# Patient Record
Sex: FEMALE | Race: WHITE | NOT HISPANIC OR LATINO | URBAN - METROPOLITAN AREA
[De-identification: names, ages, dates, MRNs, and addresses within clinical notes are randomized per-mention and may not be internally consistent; named-entity substitution may affect disease eponyms.]

---

## 2018-06-19 LAB
GBS_EXT (HISTORICAL CONVERSION): POSITIVE
HBSAG_EXT (HISTORICAL CONVERSION): NORMAL
HIV_EXT: NORMAL
RPR - HISTORICAL: NORMAL
RUBELLA_EXT (HISTORICAL CONVERSION): NORMAL

## 2019-01-23 ENCOUNTER — SURGERY - HEALTHEAST (OUTPATIENT)
Dept: OBGYN | Facility: HOSPITAL | Age: 34
End: 2019-01-23

## 2019-01-23 ENCOUNTER — ANESTHESIA - HEALTHEAST (OUTPATIENT)
Dept: OBGYN | Facility: HOSPITAL | Age: 34
End: 2019-01-23

## 2019-01-23 ENCOUNTER — HOSPITAL ENCOUNTER (INPATIENT)
Dept: OBGYN | Facility: HOSPITAL | Age: 34
Discharge: HOME | End: 2019-01-26
Attending: OBSTETRICS & GYNECOLOGY | Admitting: OBSTETRICS & GYNECOLOGY

## 2019-01-23 DIAGNOSIS — Z98.891 S/P CESAREAN SECTION: ICD-10-CM

## 2019-01-23 DIAGNOSIS — Z3A.40 40 WEEKS GESTATION OF PREGNANCY: ICD-10-CM

## 2019-01-23 LAB
ABO/RH(D): NORMAL
ANTIBODY SCREEN: NEGATIVE
HGB BLD-MCNC: 12.7 G/DL (ref 12–16)

## 2019-01-23 ASSESSMENT — MIFFLIN-ST. JEOR
SCORE: 1541.37
SCORE: 1541.37

## 2019-01-24 LAB
HGB BLD-MCNC: 9.7 G/DL (ref 12–16)
T PALLIDUM AB SER QL: NEGATIVE

## 2019-01-25 ENCOUNTER — HOME CARE/HOSPICE - HEALTHEAST (OUTPATIENT)
Dept: HOME HEALTH SERVICES | Facility: HOME HEALTH | Age: 34
End: 2019-01-25

## 2019-01-25 RX ORDER — IBUPROFEN 600 MG/1
600 TABLET, FILM COATED ORAL EVERY 6 HOURS PRN
Qty: 30 TABLET | Refills: 0 | Status: SHIPPED | OUTPATIENT
Start: 2019-01-25

## 2019-01-26 RX ORDER — OXYCODONE HYDROCHLORIDE 5 MG/1
5 TABLET ORAL EVERY 6 HOURS PRN
Qty: 20 TABLET | Refills: 0 | Status: SHIPPED | OUTPATIENT
Start: 2019-01-26

## 2021-06-02 VITALS
HEIGHT: 68 IN | WEIGHT: 177 LBS | BODY MASS INDEX: 26.83 KG/M2 | BODY MASS INDEX: 26.83 KG/M2 | WEIGHT: 177 LBS | HEIGHT: 68 IN

## 2021-06-16 PROBLEM — Z98.891 S/P CESAREAN SECTION: Status: ACTIVE | Noted: 2019-01-25

## 2021-06-16 PROBLEM — O98.911 PREGNANCY AND INFECTIOUS DISEASE, FIRST TRIMESTER: Status: ACTIVE | Noted: 2019-01-23

## 2021-06-23 NOTE — ANESTHESIA POSTPROCEDURE EVALUATION
Patient: Yenny Dallas   SECTION  Anesthesia type: No value filed.    Patient location: Labor and Delivery  Last vitals:   Vitals:    19 2339   BP:    Pulse: (!) 58   Resp:    Temp:    SpO2: 96%     Post vital signs: stable  Level of consciousness: awake and responds to simple questions  Post-anesthesia pain: pain controlled  Post-anesthesia nausea and vomiting: no  Pulmonary: unassisted, return to baseline  Cardiovascular: stable and blood pressure at baseline  Hydration: adequate  Anesthetic events: no    QCDR Measures:  ASA# 11 - Ling-op Cardiac Arrest: ASA11B - Patient did NOT experience unanticipated cardiac arrest  ASA# 12 - Ling-op Mortality Rate: ASA12B - Patient did NOT die  ASA# 13 - PACU Re-Intubation Rate: ASA13B - Patient did NOT require a new airway mgmt  ASA# 10 - Composite Anes Safety: ASA10A - No serious adverse event    Additional Notes:  Neuraxial block has worn off, no residual numbness or weakness. Pain controlled. Denies headache or back pain. No further questions or concerns. Instructed that we are available  should she have questions pertaining to her spinal.

## 2021-06-23 NOTE — PROGRESS NOTES
Dr. Blanton given update on  arrival to [facility] [place], pt of Dr. Templeton.  33 year old, 40 weeks today, ctx 2-4 min, uncomfortable, SVE as documented, GBS positive in urine, FH moderate variability variable decelerations noted x1, minimal variability for a short time and now moderate variability at present.  Orders received.

## 2021-06-23 NOTE — LACTATION NOTE
"This writer met with Yenny to assess infant at the breast and to create a breastfeeding plan for home.  Infant is at 11% weight loss and Yenny states that infant \"falls asleep at the breast right away.\"  Infant is latching well, however, he does not stay awake long enough to transfer milk.  Infant did receive donor milk x 2 with 15 ml each session, however, infant is > 65 hours and could be getting 15-30 ml per feeding.  RN was requested to weight infant prior to discharge.  This writer educated Yenny on hand expression and latch techniques.  Infant latched well but fell asleep after 2-3 minutes, despite stimulation.  Infant then was bottle fed 4 ml EBM and took 29 ml donor milk.  FOB taught bottle feeding technique and infant bottled well.  See breastfeeding plan below. This writer notes that Yenny's breasts are filling.  Infant has a receded chin and when a suck assessment was done, infant uses a chewing motion when sucking, moving the lower jaw up and down, rather than a pulling/drawing suction.  We discussed that infant's weight should be monitored and to be aware of infant's ability to transfer milk efficiently from the breast.  She is to call lactation prn.  This writer will observe one more feeding prior to discharge and infant to clinic on 1-28-19.  Yenny states \"this is a lot.\"  She was reassured that feedings should be efficient and she needs to keep up on her rest.  Her  is very supportive.  She has no further questions at this time.         Breastfeeding Plan for Yenny  1-26-19:      Hand express to get milk flowing and soften areolar tissue. Do this as needed.      Feed infant 8-12+ times in 24 hours.  Keep breastfeeding efficient.  Use breast compression to assist with milk transfer.  If infant is sleeping at the breast or not transferring food, end the feeding at the breast.    Supplement infant after every breastfeeding with mother's expressed milk (Use donor milk or formula if mother's milk " "is not available.  Feed as infant cues.  (See below for guidelines.)  Do this until infant is transferring well at the breast, mother's milk supply is well established and infant is gaining weight.      Pump breasts 10-15 minutes after every breastfeeding.  Do this until infant is transferring well at the breast, mother's milk supply is well established and infant is gaining weight.      Frequent weight checks are recommended to assure adequate milk transfer at the breast.  (1 x/ week for 2 weeks)    Call the [facility] for follow up, as needed at [phone].      Amounts to supplement when necessary:  Give more as infant cues.      1st 24 hours 2-10 ml each feeding      24-48 hours 5-15 ml each feeding  48-72 hours 15-30 ml each feeding  72-96 hours 30-60 ml each feeding --Increase amounts as infant cues  96 hours +      As infant cues      Breastfeeding Tips      Hand express breast prior to breastfeeding to soften areolar tissue and \"call in the milk.\"  Do this as needed.      Proper Positioning: Baby faces mother, in a flexed position, at the level of the breast.  Infant's ear, shoulder, hip make one straight line.  Keep infant's chin off his/her chest.      Proper Latch: Gently tap infant's upper lip with your nipple to get a wide open mouth.  Bring infant to the breast.    Infant comes to the breast, leading with the chin.  Infant's bottom lip should be about 1/2 inch from nipple.  Plant infant's bottom lip first, then roll your nipple into infant's mouth.  We want an asymmetrical latch.  You may need to make a \"breast sandwich\" to assist infant in obtaining a deep latch.  Your thumb by infant's nose, your index finger by infant's chin.   Keep the \"sandwich\" until infant can sustain the latch.      Proper Suckling : Watch for baby to begin rhythmic suckling motion with swallows heard every 1-2 sucks, by day 4 of life.  Quiet sound of swallowing is heard throughout the feeding. Mother's " breasts start out feeling full and are softer at the end of the feeding.  Drain the first breast well.  Offer both breasts at each feeding, if baby desires.    Stimulate infant while at the breast to keep baby actively sucking and transferring milk at the breast.      Compress breast during feedings to help increase transfer of milk.  Do this as needed.

## 2021-06-23 NOTE — DISCHARGE SUMMARY
"OB Discharge Summary      Date:  2019    Name:  Yenny Dallas  :  1985  MRN:  [id number]      Admission Date:  2019  Delivery Date:  2019   Gestational Age at Delivery:  40w0d  Discharge Date:  2019    Principal Diagnosis:    Patient Active Problem List   Diagnosis     Vaginal delivery     Pregnancy and infectious disease, first trimester     S/P  section       Other Diagnosis:  Term pregnancy, spontaneous labor, recurrent fetal decelerations    Conditions complicating Pregnancy:  none    Procedure(s) Performed:  Primary  delivery    Indication for :  Fetal decelerations  Indication for Induction:  NA    Brief History and Hospital Course:  Yenny is a 33 year old G1 who presented at 40 0/7 for spontaneous labor.  She initially had reassuring fetal heart tones, however she began to have recurrent deep variable fetal decelerations.  She underwent a primary  delivery for fetal intolerance to labor.  She delivered a viable infant weighing 8 lbs 7 oz with apgars of 8 and 9 at 1 and 5 minutes respectively.  QBL for delivery was 1059 mL.      She had an uncomplicated post-op course.  Post-op hemoglobin was 9.7, down from 12.7.  On POD#3 she was ambulating without difficulty, pain was controlled on oral meds, she was passing gas and tolerating a regular diet.  She was working on breastfeeding. She is discharged to home in stable condition on POD#3.    PE on day of discharge:  General: NAD  CV: S1, S2, RRR  Lungs: CTAB  Abdomen: soft, fundus firm below umbilicus, incision c/d/i  Results from last 7 days   Lab Units 19  0706   LN-HEMOGLOBIN g/dL 9.7*     Blood pressure 101/59, pulse 69, temperature 98.6  F (37  C), temperature source Oral, resp. rate 16, height 5' 8\" (1.727 m), weight 177 lb (80.3 kg), SpO2 98 %, unknown if currently breastfeeding.         Condition at Discharge:  Good    Discharge Medications:    Yenny Dallas   Home Medication Instructions " [facility], [place]:[id number]    Printed on:19 0814   Medication Information                      docusate sodium (COLACE) 100 MG capsule  Take 1 capsule (100 mg total) by mouth 2 (two) times a day.             ibuprofen (ADVIL,MOTRIN) 600 MG tablet  Take 1 tablet (600 mg total) by mouth every 6 (six) hours as needed for pain.             oxyCODONE (ROXICODONE) 5 MG immediate release tablet  Take 1 tablet (5 mg total) by mouth every 6 (six) hours as needed.             PRENATAL VIT CALC,IRON,FOLIC (PRENATAL VITAMIN ORAL)  Take 1 tablet by mouth daily.                 Discharge Plan:    Follow up with /FERMINM:  2 weeks for incision check, 6 weeks for postpartum visit   Patient Instructions:      Physical activity: No lifting more than carseat/baby, no driving on narcotics, nothing in vagina    Diet:  Regular or as tolerated    Medication:  See med rec    Other:        Physician/CNM:  Shayla Guerrier    Name:  Yenny Dallas  :  1985  MRN:  [id number]

## 2021-06-23 NOTE — ANESTHESIA PROCEDURE NOTES
Spinal Block    Patient location during procedure: OB  Start time: 1/23/2019 5:11 PM  End time: 1/23/2019 5:15 PM  Reason for block: at surgeon's request and primary anesthetic    Staffing:  Performing  Anesthesiologist: Suzette Monet MD    Preanesthetic Checklist  Completed: patient identified, risks, benefits, and alternatives discussed, timeout performed, consent obtained, airway assessed, oxygen available, suction available, emergency drugs available and hand hygiene performed  Spinal Block  Patient position: sitting  Prep: ChloraPrep and site prepped and draped  Patient monitoring: heart rate, cardiac monitor, continuous pulse ox and blood pressure  Approach: midline  Location: L3-4  Injection technique: single-shot  Needle type: pencil-tip   Needle gauge: 22 G

## 2021-06-23 NOTE — PLAN OF CARE
Urine output is 30 mL/hour or more Completed      Patient is able to void/empty bladder after catheter is removed Completed    Voiding without difficulty after pierce removed at 0630. Voided twice since with 400 mL each time.

## 2021-06-23 NOTE — LACTATION NOTE
Lactation met with Yenny to assess her breastfeeding needs.  She reports that breastfeeding is going well.  She denies nipple tenderness and states she hears infant swallowing every 4-5 sucks.  Infant is content after breastfeeding.  She is using breast compression to assist with milk transfer.  She has reviewed the Breastfeeding Your Baby handout.  Yenny denies any further questions at this time.

## 2021-06-23 NOTE — ANESTHESIA CARE TRANSFER NOTE
Last vitals:   Vitals:    01/23/19 1827   BP: 102/56   Pulse: 68   Resp: 20   Temp:  O2 Sat 97.7  100%     Patient's level of consciousness is drowsy  Spontaneous respirations: yes  Maintains airway independently: yes  Dentition unchanged: yes  Oropharynx: oropharynx clear of all foreign objects    QCDR Measures:  ASA# 20 - Surgical Safety Checklist: WHO surgical safety checklist completed prior to induction    PQRS# 430 - Adult PONV Prevention: 4558F - Pt received => 2 anti-emetic agents (different classes) preop & intraop  ASA# 8 - Peds PONV Prevention: NA - Not pediatric patient, not GA or 2 or more risk factors NOT present  PQRS# 424 - Ling-op Temp Management: 4559F - At least one body temp DOCUMENTED => 35.5C or 95.9F within required timeframe  PQRS# 426 - PACU Transfer Protocol: - Transfer of care checklist used  ASA# 14 - Acute Post-op Pain: ASA14B - Patient did NOT experience pain >= 7 out of 10

## 2021-06-23 NOTE — PLAN OF CARE
Breast Feeding      Breasts are soft with nipple integrity intact Progressing      Effective breast feeding established Progressing          Pain      Patient's pain/discomfort is manageable Progressing        Pt VSS. Pt was complaining of a lot of pain at the beginning of the shift. Order obtained from Dr Dewitt to change oxycodone order to 5 mg q4h instead of 5-10 mg q6h. Keeping on top of tylenol, Ibuprofen and oxycodone doses and pt states that pain is getting better. Will continue to monitor.

## 2021-06-23 NOTE — PLAN OF CARE
Patient vital signs are stable Progressing      Fundus firm at midline Progressing      Dressing intact until removed with any drainage marked Progressing      Pt vs are WNL. Pain controlled with Tylenol and Toradol. Will get up closer to morning. Breastfeeding baby well. Will continue to assess.

## 2021-06-23 NOTE — PLAN OF CARE
Effective breast feeding established Progressing    Worked closely with lactation today and established good breastfeeding and a plan for supplementation.    Plan to go home this evening after 1 more feeding.

## 2021-06-23 NOTE — PLAN OF CARE
Breasts are soft with nipple integrity intact Adequate for Discharge      Effective breast feeding established Adequate for Discharge      Signs and symptoms of infections are decreased or avoided Adequate for Discharge      Demonstrates ability to cope with hospitalization/illness Adequate for Discharge      Patient's pain/discomfort is manageable Adequate for Discharge

## 2021-06-23 NOTE — LACTATION NOTE
This note was copied from a baby's chart.  Yenny requested a visit from Lactation.    Yenny reports having reports having a rough time breastfeeding, it was not enjoyable her first 2 yrs ago.   She would like it to go easier this time around.   She denies a hx of  milk supply problems, sore nipples.    Educated Yenny on hand expressing, proper positioning of infant at breast, correct asymmetrical latch techniques, cluster feeding, supply and demand, listening for infant's swallows and how to use breast compression to assist with milk transfer.   Infant latched using football hold audible swallows heard.    We discussed strategies to reduce stress around feeding including  simplifying  way to tract infant output.

## 2021-06-23 NOTE — LACTATION NOTE
This writer met with Yenny to observe another feeding.  Yenny was able to latch infant well onto each breast in the football hold.  Infant was able to transfer well, with Yenny using breast compresion.  Infant's eyes are closed but infant is sucking and swallowing after every 1-3 sucks.  Yenny to offer the donor milk after this feeding, knowing that infant may not take any of it, then pump her breasts.  She will do this until infant is able to transfer well at the breast and no supplementation is needed.      This was added to infant's AFV.      1-26-19 at 2:30 pm.    Now that infant is doing better at the breast, Yenny to pump breasts after every feeding, as you are able, and supplement Lamont as he cues.  He will need less and less supplement as your milk supply increases and he transfers better at the breast.  When he is transferring well and does not need a supplement, then wean off pumping.

## 2021-06-23 NOTE — PROGRESS NOTES
"POSTPARTUM DAY # 2/       Subjective:  The patient feels well. The patient has no emotional concerns. Pain is well controlled with current medications. Patient has transitioned to oral pain medications. The patient is ambulating well. She is voiding after catheter removal and is starting to pass gas.The amount and color of the lochia is appropriate for the duration of recovery, patient denies clots. The baby is doing well.    Objective   The patient has a blood pressure which is within the normal range. Urinary output is adequate.     Exam:   /65 (Patient Position: Semi-leggett, Cuff Size: Adult Regular)   Pulse 70   Temp 98.2  F (36.8  C) (Oral)   Resp 16   Ht 5' 8\" (1.727 m)   Wt 177 lb (80.3 kg)   SpO2 98%   Breastfeeding? Unknown   BMI 26.91 kg/m    General: NAD  Abdomen: soft, NT  Fundus: firm, nontender  Incision: C/D/I  Ext: no pain       Impression:   Normal postpartum course. POD#2, LTCS secondary to non-reassuring fetal status  acute blood loss anemia      Plan:  ACUTE BLOOD LOSS ANEMIA  - patients vital signs stable  - patient asymptomatic   - component of drop likely due to dilution with IV fluids being administered        POSTOPERATIVE   - Continue current care.  - Doing well  - Likely home tomorrow if stable       Kim Inman     "

## 2021-06-23 NOTE — ANESTHESIA PREPROCEDURE EVALUATION
"Anesthesia Evaluation        Airway   Mallampati: II  Neck ROM: full   Pulmonary - normal exam                          Cardiovascular - normal exam   Neuro/Psych      Endo/Other    (+) pregnant     GI/Hepatic/Renal       Other findings: Stat C/S called. Patient \"healthy\" per nursing report.      Dental - normal exam                        Anesthesia Plan  Planned anesthetic: spinal  Stat C/S called for fetal distress. Discussed general anesthesia with patient and she is being rolled into OR.    Addendum: Surgeon requesting spinal anesthesia.  ASA 3 - emergent   Induction: intravenous   Anesthetic plan and risks discussed with: patient    Post-op plan: routine recovery          "

## 2021-06-23 NOTE — PROGRESS NOTES
Postpartum Day 1    Patient Name:  Yenny Dallas  :  1985  MRN:  [id number]      Assessment:  Normal postpartum course.    Plan:  Continue current care.    Subjective:  The patient feels well:  Voiding without difficulty, lochia normal, tolerating normal diet, and passing flatus.  Pain is well controlled with current medications.  The patient has no emotional concerns.  The baby is well and being fed by breast.    Objective:  Vitals:    19 1130   BP: 95/47   Pulse: 65   Resp: 16   Temp: 98.4  F (36.9  C)   SpO2: 98%     Patient Vitals for the past 24 hrs:   BP Temp Temp src Pulse Resp SpO2 Height Weight   19 1130 95/47 98.4  F (36.9  C) Oral 65 16 98 % -- --   19 1025 -- -- -- 72 16 98 % -- --   19 0945 -- -- -- 66 16 97 % -- --   19 0840 -- -- -- 70 18 98 % -- --   19 0737 101/60 98.3  F (36.8  C) Oral 65 16 97 % -- --   19 0330 112/58 98.4  F (36.9  C) Oral 70 18 -- -- --   19 -- -- -- (!) 58 -- 96 % -- --   19 -- -- -- 72 -- 96 % -- --   19 2332 103/58 98.3  F (36.8  C) Oral (!) 59 18 96 % -- --   19 -- -- -- 70 -- 96 % -- --   19 -- -- -- 76 -- 96 % -- --   19 -- -- -- 63 -- 96 % -- --   19 -- -- -- 60 -- 96 % -- --   19 -- -- -- 60 -- 96 % -- --   19 -- -- -- 63 -- 96 % -- --   19 -- -- -- 64 -- 97 % -- --   19 -- -- -- 60 -- -- -- --   19 -- -- -- 65 -- -- -- --   19 -- -- -- 61 -- -- -- --   19 -- -- -- 61 -- -- -- --   19 -- -- -- 63 -- -- -- --   19 -- -- -- 67 -- -- -- --   19 -- -- -- 69 -- -- -- --   19 -- -- -- 69 -- -- -- --   19 -- -- -- 62 -- -- -- --   19 -- -- -- 66 -- -- -- --   19 -- -- -- 62 -- -- -- --   19 -- -- -- 60 -- -- -- --   19 -- -- -- 71 -- -- -- --   19 -- -- -- 70 -- --  -- --   01/23/19 2200 -- -- -- 70 -- 96 % -- --   01/23/19 2154 -- -- -- 66 -- -- -- --   01/23/19 2149 -- -- -- 70 -- -- -- --   01/23/19 2144 -- -- -- 74 -- -- -- --   01/23/19 2139 -- -- -- 63 -- -- -- --   01/23/19 2137 114/59 -- -- 62 18 96 % -- --   01/23/19 2134 -- -- -- 63 -- -- -- --   01/23/19 2129 -- -- -- 75 -- -- -- --   01/23/19 2124 -- -- -- 64 -- -- -- --   01/23/19 2122 -- -- -- 68 -- -- -- --   01/23/19 2119 -- -- -- 73 -- -- -- --   01/23/19 2114 -- -- -- 69 -- -- -- --   01/23/19 2109 -- -- -- 70 -- -- -- --   01/23/19 2104 -- -- -- 74 -- -- -- --   01/23/19 2100 106/59 -- -- 72 18 99 % -- --   01/23/19 2059 -- -- -- 61 -- -- -- --   01/23/19 2056 -- -- -- 66 -- -- -- --   01/23/19 2054 -- -- -- 60 -- -- -- --   01/23/19 2049 -- -- -- (!) 58 -- -- -- --   01/23/19 2045 -- -- -- 61 -- -- -- --   01/23/19 2044 -- -- -- (!) 58 -- -- -- --   01/23/19 2039 -- -- -- (!) 57 -- -- -- --   01/23/19 2034 -- -- -- 64 -- -- -- --   01/23/19 2030 110/62 -- -- 60 16 100 % -- --   01/23/19 2029 -- -- -- 62 -- -- -- --   01/23/19 2024 -- -- -- 66 -- -- -- --   01/23/19 2019 -- -- -- 70 -- -- -- --   01/23/19 2015 115/61 -- -- (!) 58 18 100 % -- --   01/23/19 2014 -- -- -- 69 -- -- -- --   01/23/19 2009 -- -- -- 75 -- -- -- --   01/23/19 2004 -- -- -- 77 -- -- -- --   01/23/19 2003 -- -- -- 81 -- -- -- --   01/23/19 2000 108/57 -- -- 68 16 100 % -- --   01/23/19 1959 -- -- -- 62 -- -- -- --   01/23/19 1954 -- -- -- 74 -- -- -- --   01/23/19 1949 -- -- -- 67 -- -- -- --   01/23/19 1945 109/52 -- -- 71 18 100 % -- --   01/23/19 1944 -- -- -- 71 -- -- -- --   01/23/19 1942 -- -- -- 80 -- -- -- --   01/23/19 1939 -- -- -- 87 -- -- -- --   01/23/19 1934 -- -- -- 78 -- -- -- --   01/23/19 1930 118/55 -- -- 100 18 -- -- --   01/23/19 1915 113/56 -- -- 86 18 98 % -- --   01/23/19 1900 112/57 -- -- 84 18 98 % -- --   01/23/19 1845 107/56 -- -- 81 18 98 % -- --   01/23/19 1830 102/55 -- -- 69 20 100 % -- --   01/23/19 1820  "102/56 -- -- 68 20 -- -- --   01/23/19 1512 127/82 98.3  F (36.8  C) Oral 86 18 -- 5' 8\" (1.727 m) 177 lb (80.3 kg)       The amount and color of the lochia is appropriate for the duration of recovery.  The uterine fundus is firm.  Urinary output is adequate.  The stapled incision dressing is clean, dry and intact.    Prenatal Labs  Result Component Result Previous Result   ABO/Rh External Result A Positive (5/10/2016) No Results   ABORh A POS (1/23/2019) No Results   Hemoglobin External Result 13.4 (5/10/2016) No Results   Rubella External Result Immune (6/19/2018) Immune (5/10/2016)       Immunization History   Administered Date(s) Administered     Influenza, inj, historic,unspecified 09/29/2016     Tdap 09/15/2016       Provider:  Shayla Lee      Date:  1/24/2019  Time:  12:36 PM  "

## 2021-06-23 NOTE — PLAN OF CARE
Patient's pain/discomfort is manageable Progressing    Pt did received Duramorph; pt rates pain at a 0 thus far, given tylenol and toradol.  Continue to monitor pain during VS assessments.  Pt encouraged to stay ahead of pain and to notify nursing as soon as she becomes uncomfortable.

## 2021-06-23 NOTE — PLAN OF CARE
Effective breast feeding established Progressing    Latch will be greater than or equal to 8    Patient's pain/discomfort is manageable Progressing    Pain will be less than or equal to 4

## 2021-06-23 NOTE — PROGRESS NOTES
arrival for r/o labor ctx started last night, returned about noon today with increasing intensity, sve as noted, FH as documented.  Will call MD with update and for orders.

## 2021-06-23 NOTE — PLAN OF CARE
Fundus firm at midline Completed      Light rubra without clots, no purulent discharge, no foul smelling lochia Completed      Breasts are soft with nipple integrity intact Progressing      Effective breast feeding established Progressing    Breast feeding very well, great latch will monitor infant weight at 24 hour testing.  Dressing intact until removed with any drainage marked Progressing      Daily care needs are met Progressing    Independent with routine post partum surgical care. Up and showered and walking in her room. Voiding without difficulty.

## 2021-06-23 NOTE — OP NOTE
DATE OF SERVICE: 2019    DATE OF SERVICE:  2019    PREOPERATIVE DIAGNOSIS:  Intrauterine pregnancy at term, repetitive very deep  variables with late return.    POSTOPERATIVE DIAGNOSIS:  Intrauterine pregnancy at term, repetitive very deep  variables with late return, occiput posterior presentation.    HISTORY:  The patient is a 33-year-old para 1-0-0-1, followed by me for prenatal  care.  Patient was admitted to labor and delivery on 2019 in labor at  approximately 4 cm.  Fetal heart tones were initially good and then she developed  deep decelerations.  She was placed in knee-chest, O2 and continued with the deep  variables and decision was made to go ahead with a primary  section with Dr. Blanton in attendance.  Risks, benefits, alternatives reviewed and permit signed.    DESCRIPTION OF PROCEDURE:  Patient was taken to the operating room and under spinal  placed in left lateral tilt position, prepped and draped in the usual manner.   Pfannenstiel incision was made by Dr. Blanton.  This was extended to the fascia with  a clean scalpel.  Fascia incised in midline, extended bilaterally with Hernandez  scissors.  The fascia was  from the rectus and pyramidalis muscle with  sharp and blunt dissection.  Rectus muscle  in the midline.  The peritoneum  was entered.  The peritoneal incision was extended superiorly and inferiorly with  good exposure of the bladder and the bladder flap was taken down with sharp and  blunt dissection.  A low segment transverse incision was made on the uterus and  extended bilaterally.  Infant was delivered 2019 at 1725 with Apgars of 8 and  9.  Placenta was manually removed and Probangs were placed on the angles of the  uterine incision.  The uterus was wiped clean.  At this point, I did come in to  assist.  There was bleeding from the left angle and multiple sutures of 0 chromic  were placed with good hemostasis.  The first layer was then  imbricated, was then  sutured with #1 chromic.  Second layer was imbricated horizontally with #1 chromic.   Hemostasis was good and the bladder flap was reapproximated with 3-0 Vicryl.  Tubes  and ovaries were normal.  Counts were correct.  Pelvis was irrigated with clear  return and uterus was reinserted into the abdomen.  The peritoneum was then closed  with 3-0 Vicryl, the fascia with 0 Vicryl intermittently locked, subcu fat with 3-0  plain and skin with staples.    ESTIMATED BLOOD LOSS:  860 mL.    SURGEON:  Dr. Blanton/Dr. Ibarra    ASSISTANT:  Resident      ANESTHESIA:  Spinal.      KAREN IBARRA MD  pa  D 01/23/2019 18:31:09  T 01/23/2019 18:51:20  R 01/23/2019 18:51:20  [id number]        cc:KAREN IBARRA MD

## 2021-06-23 NOTE — PLAN OF CARE
Pain      Patient's pain/discomfort is manageable Progressing        Adjustment in pain meds was done this shift by increasing the oxycodone to 5-10 mg q4h and that has made a big improvement in pain relief for her. Yenny has taken 2 walks in the garcia this shift.    Postpartum Care      Dressing intact until removed with any drainage marked Progressing        Mepelex dressing was removed this shift per doctor's order. There was redness present on skin outside of dressing before removal and that was gone 1 hour after dressing removal.

## 2021-06-23 NOTE — H&P
Assessment/Plan:     40 and 0/7 weeks gestation.  Active phase labor.  Obstetrical history significant for nl.       Risks, benefits, alternatives and possible complications have been discussed in detail with the patient.  Pre-admission, admission, and post admission procedures and expectations were discussed in detail.  All questions answered, all appropriate consents will be signed at the Hospital. Admission is planned for today.   Expectant management.     Subjective:     Yenny Dallas is a 33 y.o.  female with EDC term at 40 and 0/7 weeks gestation who is being admitted for labor management.  Her current obstetrical history is significant for nl.  Patient reports contractions since am.   Fetal Movement: normal.   Objective:       Vital signs in last 24 hours:  Temp:  [98.3  F (36.8  C)] 98.3  F (36.8  C)  Heart Rate:  [86] 86  Resp:  [18] 18  BP: (127)/(82) 127/82    General:     Skin:     HEENT:    Lungs:     Heart:     Breasts:     Abdomen:    Pelvis:    FHT:  nl BPM   Uterine Size: size equals dates   Presentations: cephalic   Cervix:    Dilation: 4   Effacement: 50%   Station:  -2   Consistency: medium   Position: middle      Lab Review   A   AFP:NML   One hour GTT: Normal

## 2021-06-23 NOTE — PROGRESS NOTES
Hernandez catheter removed at 0630. Pt got up well for first time up. Stood and marched at bedside. Pt wishes to order breakfast and will shower after. Will need standby assist for next up.

## 2021-06-23 NOTE — PROGRESS NOTES
In [facility] [place], OB Dr. Blanton at bedside, FH improved per physician change to urgent  from STAT and pt okay for spinal.  Anesthesia at OR bedside for this discussion as well.

## 2021-07-14 PROBLEM — Z34.90 PREGNANT: Status: RESOLVED | Noted: 2019-01-23 | Resolved: 2019-01-25
